# Patient Record
Sex: FEMALE | Race: WHITE | NOT HISPANIC OR LATINO | URBAN - METROPOLITAN AREA
[De-identification: names, ages, dates, MRNs, and addresses within clinical notes are randomized per-mention and may not be internally consistent; named-entity substitution may affect disease eponyms.]

---

## 2021-10-28 ENCOUNTER — TELEHEALTH PROVIDED OTHER THAN IN PATIENT'S HOME (OUTPATIENT)
Dept: URBAN - METROPOLITAN AREA TELEHEALTH 7 | Facility: TELEHEALTH | Age: 31
End: 2021-10-28

## 2021-10-28 VITALS — WEIGHT: 192 LBS | HEIGHT: 68 IN

## 2021-10-28 DIAGNOSIS — R10.84 GENERALIZED ABDOMINAL PAIN: ICD-10-CM

## 2021-10-28 DIAGNOSIS — R14.0 ABDOMINAL DISTENSION (GASEOUS): ICD-10-CM

## 2021-10-28 DIAGNOSIS — R19.4 CHANGE IN BOWEL HABIT: ICD-10-CM

## 2021-10-28 PROCEDURE — 99204 OFFICE O/P NEW MOD 45 MIN: CPT | Mod: 95 | Performed by: PHYSICIAN ASSISTANT

## 2021-10-28 NOTE — SERVICEHPINOTES
PATIENT VERIFIED BY DATE OF BIRTH AND NAME. Patient has been consented for this telecommunication visit.
mary anne north 
31 yo female with h/o scleroderma presents with abdominal pain.
mary anne north She developed bad RLQ pain in September and went to [place] [facility]. Reports a CT scan and was told mesenteric adenitis. Advised to take daily Aleve (took for a week, then PRN - has been using a few times a week), though pain was nearly fully resolved after the first day (received Dilaudid in ER). 
br
mary anne A couple of weeks ago, she was in [place] and was driving and felt a muscle spasm like pain in her left lower back. Went to get a pain patch OTC but while in the store, pain got so bad that she was on the floor in the bathroom and had to have a friend get her. Went to ER at [facility] - reports abdominal and pelvic U/S and noncontrast CT. Was told her uterus is tilted but otherwise normal findings. Has had more urinary symptoms since then, but was told no urinary infection and has had more than one test.
mary anne north She also reports change in bowel habits. Has had some looser stools and instead of once daily in the AM, timing is different and she can have urge to go but sometimes can't evacuate as well. Stools also thinner caliber. No blood in stools. Feels full all the time now so she is not eating as much. Feels worse later in the day. Lying down usually helps. mary anne north No vomiting or fevers. 
mary anne north Notes h/o more health issues with higher stress.br
brCan have some lower abdominal pain after doing some physical work (with horses).
mary anne north She denies family h/o IBD grandfather had celiac. mary anne

## 2021-10-29 ENCOUNTER — OFFICE (OUTPATIENT)
Dept: URBAN - METROPOLITAN AREA CLINIC 79 | Facility: CLINIC | Age: 31
End: 2021-10-29

## 2021-10-29 PROCEDURE — 00038: CPT | Performed by: INTERNAL MEDICINE

## 2021-11-15 ENCOUNTER — OFFICE (OUTPATIENT)
Dept: URBAN - METROPOLITAN AREA CLINIC 98 | Facility: CLINIC | Age: 31
End: 2021-11-15

## 2021-11-15 VITALS
OXYGEN SATURATION: 98 % | SYSTOLIC BLOOD PRESSURE: 130 MMHG | HEART RATE: 57 BPM | WEIGHT: 192 LBS | RESPIRATION RATE: 16 BRPM | HEIGHT: 68 IN | HEART RATE: 98 BPM | SYSTOLIC BLOOD PRESSURE: 129 MMHG | DIASTOLIC BLOOD PRESSURE: 78 MMHG | OXYGEN SATURATION: 99 % | SYSTOLIC BLOOD PRESSURE: 112 MMHG | DIASTOLIC BLOOD PRESSURE: 85 MMHG | HEART RATE: 73 BPM | DIASTOLIC BLOOD PRESSURE: 62 MMHG | DIASTOLIC BLOOD PRESSURE: 77 MMHG | TEMPERATURE: 97.7 F | SYSTOLIC BLOOD PRESSURE: 126 MMHG | RESPIRATION RATE: 12 BRPM | DIASTOLIC BLOOD PRESSURE: 76 MMHG | SYSTOLIC BLOOD PRESSURE: 117 MMHG | HEART RATE: 52 BPM | HEART RATE: 77 BPM

## 2021-11-15 DIAGNOSIS — R14.0 ABDOMINAL DISTENSION (GASEOUS): ICD-10-CM

## 2021-11-15 DIAGNOSIS — R19.4 CHANGE IN BOWEL HABIT: ICD-10-CM

## 2021-11-15 DIAGNOSIS — R10.84 GENERALIZED ABDOMINAL PAIN: ICD-10-CM
